# Patient Record
Sex: MALE | Race: WHITE | NOT HISPANIC OR LATINO | Employment: FULL TIME | URBAN - METROPOLITAN AREA
[De-identification: names, ages, dates, MRNs, and addresses within clinical notes are randomized per-mention and may not be internally consistent; named-entity substitution may affect disease eponyms.]

---

## 2020-02-10 ENCOUNTER — OFFICE VISIT (OUTPATIENT)
Dept: GASTROENTEROLOGY | Facility: CLINIC | Age: 55
End: 2020-02-10
Payer: COMMERCIAL

## 2020-02-10 VITALS
HEART RATE: 95 BPM | BODY MASS INDEX: 24.01 KG/M2 | DIASTOLIC BLOOD PRESSURE: 94 MMHG | HEIGHT: 67 IN | WEIGHT: 153 LBS | SYSTOLIC BLOOD PRESSURE: 140 MMHG

## 2020-02-10 DIAGNOSIS — Z12.11 COLON CANCER SCREENING: ICD-10-CM

## 2020-02-10 DIAGNOSIS — K64.8 OTHER HEMORRHOIDS: ICD-10-CM

## 2020-02-10 DIAGNOSIS — K62.5 RECTAL BLEEDING: Primary | ICD-10-CM

## 2020-02-10 PROCEDURE — 99213 OFFICE O/P EST LOW 20 MIN: CPT | Performed by: NURSE PRACTITIONER

## 2020-02-10 RX ORDER — TIMOLOL MALEATE 5 MG/ML
SOLUTION/ DROPS OPHTHALMIC
COMMUNITY
Start: 2020-02-03

## 2020-02-10 RX ORDER — BIMATOPROST 0.01 %
DROPS OPHTHALMIC (EYE)
COMMUNITY
Start: 2020-01-06

## 2020-02-10 NOTE — PATIENT INSTRUCTIONS
Continue Metamucil daily   Increase water intake   Use Preparation H suppositories daily at night for 14 days   Sit in warm water baths 15-20 minutes at least twice daily     Follow up in 3-4 weeks

## 2020-02-10 NOTE — PROGRESS NOTES
[id number] - Outpatient Follow-up Note  Hanna Huber 48 y o  male MRN: [id number]  Encounter: [id number]    ASSESSMENT AND PLAN:      1  Rectal bleeding  2  Other hemorrhoids   Episode of blood in the toilet and with wiping on the toilet paper last week  Has not had any further episodes of bleeding  Hemoglobin on 02/05 was 14 2  Patient did have grade 2 internal hemorrhoids on last colonoscopy in 2018  Most likely hemorrhoidal bleeding      -recommend Preparation-H suppositories nightly for 14 days  -continue Metamucil fiber supplementation daily  -Sitz baths  -increase fluid intake    If patient has any further episodes of bleeding, pain, or symptoms do not improve, will proceed with repeat colonoscopy  This can be discussed at next follow-up appointment  2  Colon cancer screening  Average risk  Last colonoscopy was on 03/05/2018  This showed normal mucosa throughout the whole colon and grade 2 internal hemorrhoids  A 10 year recall was advised, March 2028  Followup Appointment: 3-4 weeks   ______________________________________________________________________    Chief Complaint   Patient presents with    Rectal Bleeding     referred by employee wellness center     HPI: Hanna Huber is a 48y o  year old male who presents to the office today for rectal bleeding  Patient reports that last Wednesday, he noticed a large amount of blood in the toilet and on the toilet paper with wiping  He denies any melena  He has not had any further episodes since last Wednesday  He saw his avoxside at work, and had a rectal exam which was positive for occult blood in stool  He had a CBC checked on 02/05 which was normal   He denies any fever, chills, abdominal pain, change in bowel habits, melena, weight loss, rectal pain, itching  He typically has 1 formed bowel movement daily  His appetite is stable            Historical Information   Past Medical History: Diagnosis Date    Kidney stone      Past Surgical History:   Procedure Laterality Date    COLONOSCOPY  03/05/2018    normal mucosa, grade 2 internal hemorrhoids, 10 year recall 2028     LITHOTRIPSY       Social History     Substance and Sexual Activity   Alcohol Use Not Currently     Social History     Substance and Sexual Activity   Drug Use Not on file     Social History     Tobacco Use   Smoking Status Never Smoker   Smokeless Tobacco Never Used     Family History   Problem Relation Age of Onset    Diabetes Father     Colon cancer Neg Hx     Colon polyps Neg Hx          Current Outpatient Medications:     LUMIGAN 0 01 % ophthalmic drops    timolol (TIMOPTIC) 0 5 % ophthalmic solution  No Known Allergies  Reviewed medications and allergies and updated as indicated    PHYSICAL EXAM:    Blood pressure 140/94, pulse 95, height 5' 7" (1 702 m), weight 69 4 kg (153 lb)  Body mass index is 23 96 kg/m²  General Appearance: NAD, cooperative, alert  Eyes: Anicteric, PERRLA, EOMI  ENT:  Normocephalic, atraumatic, normal mucosa  Neck:  Supple, symmetrical, trachea midline  Resp:  Clear to auscultation bilaterally; no rales, rhonchi or wheezing; respirations unlabored   CV:  S1 S2, Regular rate and rhythm; no murmur, rub, or gallop  GI:  Soft, non-tender, non-distended; normal bowel sounds; no masses, no organomegaly   Rectal: no external hemorrhoids identified   Musculoskeletal: No cyanosis, clubbing or edema  Normal ROM  Skin:  No jaundice, rashes, or lesions   Heme/Lymph: No palpable cervical lymphadenopathy  Psych: Normal affect, good eye contact  Neuro: No gross deficits, AAOx3    Lab Results:   No results found for: WBC, HGB, HCT, MCV, PLT  No results found for: NA, K, CL, CO2, ANIONGAP, BUN, CREATININE, GLUCOSE, GLUF, CALCIUM, CORRECTEDCA, AST, ALT, ALKPHOS, PROT, BILITOT, EGFR  No results found for: IRON, TIBC, FERRITIN  No results found for: LIPASE    Radiology Results:   No results found

## 2020-03-16 ENCOUNTER — OFFICE VISIT (OUTPATIENT)
Dept: GASTROENTEROLOGY | Facility: CLINIC | Age: 55
End: 2020-03-16
Payer: COMMERCIAL

## 2020-03-16 VITALS
BODY MASS INDEX: 24.17 KG/M2 | HEART RATE: 88 BPM | SYSTOLIC BLOOD PRESSURE: 140 MMHG | DIASTOLIC BLOOD PRESSURE: 90 MMHG | WEIGHT: 154 LBS | HEIGHT: 67 IN

## 2020-03-16 DIAGNOSIS — Z12.11 COLON CANCER SCREENING: ICD-10-CM

## 2020-03-16 DIAGNOSIS — K64.8 OTHER HEMORRHOIDS: ICD-10-CM

## 2020-03-16 DIAGNOSIS — K62.5 RECTAL BLEEDING: Primary | ICD-10-CM

## 2020-03-16 PROCEDURE — 99213 OFFICE O/P EST LOW 20 MIN: CPT | Performed by: NURSE PRACTITIONER

## 2020-03-16 NOTE — PATIENT INSTRUCTIONS
Continue Metamucil daily in the morning   Increase fluid intake   May use Preparation H ointment as needed     Monitor symptoms and call office if symptoms return or worsen     Follow up as needed       Hemorrhoids   WHAT YOU NEED TO KNOW:   Hemorrhoids are swollen blood vessels inside your rectum (internal hemorrhoids) or on your anus (external hemorrhoids)  Sometimes a hemorrhoid may prolapse  This means it extends out of your anus  DISCHARGE INSTRUCTIONS:   Return to the emergency department if:   · You have severe pain in your rectum or around your anus  · You have severe pain in your abdomen and you are vomiting  · You have bleeding from your anus that soaks through your underwear  Contact your healthcare provider if:   · You have frequent and painful bowel movements  · Your hemorrhoid looks or feels more swollen than usual      · You do not have a bowel movement for 2 days or more  · You see or feel tissue coming through your anus  · You have questions or concerns about your condition or care  Medicines: You may  need any of the following:  · A pad, cream, or ointment  can help decrease pain, swelling, and itching  · Stool softeners  help treat or prevent constipation  · NSAIDs , such as ibuprofen, help decrease swelling, pain, and fever  NSAIDs can cause stomach bleeding or kidney problems in certain people  If you take blood thinner medicine, always ask your healthcare provider if NSAIDs are safe for you  Always read the medicine label and follow directions  · Take your medicine as directed  Contact your healthcare provider if you think your medicine is not helping or if you have side effects  Tell him or her if you are allergic to any medicine  Keep a list of the medicines, vitamins, and herbs you take  Include the amounts, and when and why you take them  Bring the list or the pill bottles to follow-up visits   Carry your medicine list with you in case of an emergency  Manage your symptoms:   · Apply ice on your anus for 15 to 20 minutes every hour or as directed  Use an ice pack, or put crushed ice in a plastic bag  Cover it with a towel before you apply it to your anus  Ice helps prevent tissue damage and decreases swelling and pain  · Take a sitz bath  Fill a bathtub with 4 to 6 inches of warm water  You may also use a sitz bath pan that fits inside a toilet bowl  Sit in the sitz bath for 15 minutes  Do this 3 times a day, and after each bowel movement  The warm water can help decrease pain and swelling  · Keep your anal area clean  Gently wash the area with warm water daily  Soap may irritate the area  After a bowel movement, wipe with moist towelettes or wet toilet paper  Dry toilet paper can irritate the area  Prevent hemorrhoids:   · Do not strain to have a bowel movement  Do not sit on the toilet too long  These actions can increase pressure on the tissues in your rectum and anus  · Drink plenty of liquids  Liquids can help prevent constipation  Ask how much liquid to drink each day and which liquids are best for you  · Eat a variety of high-fiber foods  Examples include fruits, vegetables, and whole grains  Ask your healthcare provider how much fiber you need each day  You may need to take a fiber supplement  · Exercise as directed  Exercise, such as walking, may make it easier to have a bowel movement  Ask your healthcare provider to help you create an exercise plan  · Do not have anal sex  Anal sex can weaken the skin around your rectum and anus  · Avoid heavy lifting  This can cause straining and increase your risk for another hemorrhoid  Follow up with your healthcare provider as directed:  Write down your questions so you remember to ask them during your visits     © 2017 [employer] Information is for End User's use only and may not be sold, redistributed or otherwise used for commercial purposes  All illustrations and images included in CareNotes® are the copyrighted property of A [employer] M [employer]  or Cristian Vega  The above information is an  only  It is not intended as medical advice for individual conditions or treatments  Talk to your doctor, nurse or pharmacist before following any medical regimen to see if it is safe and effective for you

## 2020-03-16 NOTE — PROGRESS NOTES
[facility] - Outpatient Follow-up Note  Tanisha Dickinson 48 y o  male MRN: [id number]  Encounter: [id number]    ASSESSMENT AND PLAN:      1  Rectal bleeding  2  Other hemorrhoids  Symptoms have improved since last office visit  Patient denies any further episodes of rectal bleeding  Most likely symptoms were attributed to hemorrhoidal bleeding      -continue daily fiber supplementation (Metamucil 1 packet in AM)   -continue to increase fluid intake  -can use preparation H ointment p r n   -avoid prolonged sitting or straining  -if rectal bleeding returns or symptoms worsen, will repeat colonoscopy at that time  3  Colon cancer screening  Average risk  Last colonoscopy was on 3/5/18  This showed normal mucosa throughout the whole colon and grade 2 internal hemorrhoids  A 10 year recall was recommended, March 2028  Followup Appointment: As needed or if symptoms worsen   ______________________________________________________________________    Chief Complaint   Patient presents with    Follow-up     HPI: Tanisha Dickinsno is a 48y o  year old male returns to the office today for follow-up of hemorrhoids and rectal bleeding  Patient was seen in the office on 02/10 and had an episode of blood in the toilet and with wiping on the toilet paper  Hemoglobin was stable at that time  He denies any further episodes of rectal bleeding  He states that he is moving his bowels regularly  He denies any fever/chills, nausea/vomiting, abdominal pain, diarrhea, rectal bleeding, melena            Historical Information   Past Medical History:   Diagnosis Date    Kidney stone     Ruptured spleen     did not require splenectomy     Past Surgical History:   Procedure Laterality Date    COLONOSCOPY  03/05/2018    normal mucosa, grade 2 internal hemorrhoids, 10 year recall 2028     LITHOTRIPSY       Social History     Substance and Sexual Activity   Alcohol Use Not Currently     Social History Substance and Sexual Activity   Drug Use Not on file     Social History     Tobacco Use   Smoking Status Never Smoker   Smokeless Tobacco Never Used     Family History   Problem Relation Age of Onset    No Known Problems Mother     Diabetes Father     No Known Problems Sister     No Known Problems Sister     Colon cancer Neg Hx     Colon polyps Neg Hx          Current Outpatient Medications:     LUMIGAN 0 01 % ophthalmic drops    timolol (TIMOPTIC) 0 5 % ophthalmic solution  No Known Allergies  Reviewed medications and allergies and updated as indicated    PHYSICAL EXAM:    Blood pressure 140/90, pulse 88, height 5' 7" (1 702 m), weight 69 9 kg (154 lb)  Body mass index is 24 12 kg/m²  General Appearance: NAD, cooperative, alert  Eyes: Anicteric, PERRLA, EOMI  ENT:  Normocephalic, atraumatic, normal mucosa  Neck:  Supple, symmetrical, trachea midline  Resp:  Clear to auscultation bilaterally; no rales, rhonchi or wheezing; respirations unlabored   CV:  S1 S2, Regular rate and rhythm; no murmur, rub, or gallop  GI:  Soft, non-tender, non-distended; normal bowel sounds; no masses, no organomegaly   Rectal: Deferred  Musculoskeletal: No cyanosis, clubbing or edema  Normal ROM  Skin:  No jaundice, rashes, or lesions   Heme/Lymph: No palpable cervical lymphadenopathy  Psych: Normal affect, good eye contact  Neuro: No gross deficits, AAOx3    Lab Results:   No results found for: WBC, HGB, HCT, MCV, PLT  No results found for: NA, K, CL, CO2, ANIONGAP, BUN, CREATININE, GLUCOSE, GLUF, CALCIUM, CORRECTEDCA, AST, ALT, ALKPHOS, PROT, BILITOT, EGFR  No results found for: IRON, TIBC, FERRITIN  No results found for: LIPASE    Radiology Results:   No results found

## 2020-08-11 ENCOUNTER — TELEPHONE (OUTPATIENT)
Dept: GASTROENTEROLOGY | Facility: CLINIC | Age: 55
End: 2020-08-11

## 2020-08-11 ENCOUNTER — OFFICE VISIT (OUTPATIENT)
Dept: GASTROENTEROLOGY | Facility: CLINIC | Age: 55
End: 2020-08-11
Payer: COMMERCIAL

## 2020-08-11 VITALS
SYSTOLIC BLOOD PRESSURE: 160 MMHG | WEIGHT: 148 LBS | BODY MASS INDEX: 23.23 KG/M2 | DIASTOLIC BLOOD PRESSURE: 92 MMHG | TEMPERATURE: 97.3 F | HEIGHT: 67 IN

## 2020-08-11 DIAGNOSIS — K62.89 RECTAL PAIN: Primary | ICD-10-CM

## 2020-08-11 DIAGNOSIS — K64.5 EXTERNAL THROMBOSED HEMORRHOIDS: ICD-10-CM

## 2020-08-11 DIAGNOSIS — Z12.11 SCREENING FOR COLON CANCER: ICD-10-CM

## 2020-08-11 PROCEDURE — 99214 OFFICE O/P EST MOD 30 MIN: CPT | Performed by: NURSE PRACTITIONER

## 2020-08-11 NOTE — PROGRESS NOTES
[facility] - Outpatient Follow-up Note  Carlotta Reyna 48 y o  male MRN: [id number]  Encounter: [id number]    ASSESSMENT AND PLAN:      1  Rectal pain  2  External thrombosed hemorrhoids   Two large external thrombosed hemorrhoids noted on today's rectal examination  Extremely painful to palpate  Referral to colorectal surgeon - the patient has an appointment with Dr Choco Mckinley, colorectal surgeon, today at 12:30 p m  Sit in a warm bath tub for 15 minutes daily  Increase fluid and fiber  Daily fiber supplementation  Preparation-H hemorrhoidal cream free to 4 times a day  Referral to colorectal surgeon    3  Screening for colon cancer  Average risk for colorectal neoplasm    Last colonoscopy was on 3/5/18  This showed normal mucosa throughout the whole colon and grade 2 internal hemorrhoids  A 10 year recall advised  Next screening colonoscopy due in March of 2028  Followup Appointment:  4-6 weeks  ______________________________________________________________________    Chief Complaint   Patient presents with    Hemorrhoids     HPI:     Longstanding issues rectal bleeding secondary to hemorrhoids  Scant amount of bleeding noted on toilet tissue, episodically for the past  couple of years  Inrequent over the several weeks  However, severe rectal pain yesterday that became progressively worse  Pain so severe that he needed to leave work  Takes daily fiber supplement daily and has been trying to increase water intake  Denies straining to have bowel movement  Denies change in bowel habits, constipation or diarrhea  Denies fevers or chills  Denies any rectal purulent drainage  Appetite and weight stable  Denies any abdominal pain, nausea, vomiting or melena      Historical Information   Past Medical History:   Diagnosis Date    Kidney stone     Ruptured spleen     did not require splenectomy     Past Surgical History:   Procedure Laterality Date    COLONOSCOPY  03/05/2018    normal mucosa, grade 2 internal hemorrhoids, 10 year recall 2028     LITHOTRIPSY       Social History     Substance and Sexual Activity   Alcohol Use Not Currently     Social History     Substance and Sexual Activity   Drug Use Not on file     Social History     Tobacco Use   Smoking Status Never Smoker   Smokeless Tobacco Never Used     Family History   Problem Relation Age of Onset    No Known Problems Mother     Diabetes Father     No Known Problems Sister     No Known Problems Sister     Colon cancer Neg Hx     Colon polyps Neg Hx          Current Outpatient Medications:     LUMIGAN 0 01 % ophthalmic drops    timolol (TIMOPTIC) 0 5 % ophthalmic solution  No Known Allergies  Reviewed medications and allergies and updated as indicated    PHYSICAL EXAM:    Blood pressure 160/92, temperature (!) 97 3 °F (36 3 °C), height 5' 7" (1 702 m), weight 67 1 kg (148 lb)  Body mass index is 23 18 kg/m²  General Appearance: NAD, cooperative, alert  Eyes: Anicteric  ENT:  Normocephali  Neck:  Appears normal  Resp:  Clear to auscultation bilaterally; no rales, rhonchi or wheezing; respirations unlabored   CV:  S1 S2, Regular rate and rhythm; no murmur, rub, or gallop  GI:  Soft, non-tender, non-distended; normal bowel sounds; no masses, no organomegaly   Rectal:  Two large thrombosed hemorrhoids, purple in color, extremely painful to touch  Musculoskeletal: No cyanosis, clubbing or edema  Normal ROM  Skin:  No jaundice, rashes, or lesions   Psych: Normal affect, good eye contact  Neuro: No gross deficits, AAOx3    Lab Results:   No results found for: WBC, HGB, HCT, MCV, PLT  No results found for: NA, K, CL, CO2, ANIONGAP, BUN, CREATININE, GLUCOSE, GLUF, CALCIUM, CORRECTEDCA, AST, ALT, ALKPHOS, PROT, BILITOT, EGFR  No results found for: IRON, TIBC, FERRITIN  No results found for: LIPASE    Radiology Results:   No results found

## 2020-08-11 NOTE — PATIENT INSTRUCTIONS
Sit in a warm bath tub for 15 minutes daily  Increase fluid and fiber  Daily fiber supplementation  Referral to colorectal surgeon

## 2020-08-11 NOTE — TELEPHONE ENCOUNTER
Rec advised was transferring Pt who has painful hemorrhoid/needs to know if he should go to the ER  Appt sched'd tomorrow  Pt was not on the line

## 2020-08-11 NOTE — TELEPHONE ENCOUNTER
Pt called back; questions what to do for pain? - rates 8-9; has 2 painful hemorrhoids  Advised ER or calling surgeon  Pt considered ER; was going to keep appt tomorrow  While talking w/ Pt nurse noted avail appt today w/ Toshia Sanders  Offered/Pt accepted

## 2020-09-14 ENCOUNTER — OFFICE VISIT (OUTPATIENT)
Dept: GASTROENTEROLOGY | Facility: CLINIC | Age: 55
End: 2020-09-14
Payer: COMMERCIAL

## 2020-09-14 VITALS
DIASTOLIC BLOOD PRESSURE: 88 MMHG | HEART RATE: 107 BPM | TEMPERATURE: 97.7 F | WEIGHT: 148 LBS | BODY MASS INDEX: 23.23 KG/M2 | HEIGHT: 67 IN | SYSTOLIC BLOOD PRESSURE: 128 MMHG

## 2020-09-14 DIAGNOSIS — Z12.11 SCREENING FOR COLON CANCER: ICD-10-CM

## 2020-09-14 DIAGNOSIS — K64.5 EXTERNAL THROMBOSED HEMORRHOIDS: Primary | ICD-10-CM

## 2020-09-14 PROCEDURE — 99214 OFFICE O/P EST MOD 30 MIN: CPT | Performed by: NURSE PRACTITIONER

## 2020-09-14 NOTE — PROGRESS NOTES
[facility] - Outpatient Follow-up Note  Andrea Navas 48 y o  male MRN: [id number]  Encounter: [id number]    ASSESSMENT AND PLAN:      1  External thrombosed hemorrhoids  Underwent an emergent hemorrhoidectomy on 08/12 by Dr Patsy Washington for 2 large thrombosed hemorrhoids noted on last office visit  Resolved  - continue to increase fluid and fiber  - continue daily fiber supplementation    2  Screening for colon cancer   Average risk  Last colonoscopy in March 2018 showed the absence adenomatous colon polyps  A 10 year recall advised  Followup Appointment:  As needed  ______________________________________________________________________    Chief Complaint   Patient presents with    Follow-up    Hemorrhoids     HPI:    Follow-up office visit to two large thrombosed hemorrhoids that were identified at last office visit on 08/11  He was subsequently seen by a colorectal surgeon, Dr Patsy Washington, the following day and underwent an emergency hemorrhoidectomy that was performed in the hospital setting due to the large size of the hemorrhoids  Did experience some rectal pain approximately a week after surgery, but denies any further rectal pain  Denies any rectal bleeding, constipation, diarrhea, melena, nausea, vomiting, fevers or chills  Appetite good and weight stable      Historical Information   Past Medical History:   Diagnosis Date    Kidney stone     Ruptured spleen     did not require splenectomy     Past Surgical History:   Procedure Laterality Date    COLONOSCOPY  03/05/2018    normal mucosa, grade 2 internal hemorrhoids, 10 year recall 2028     LITHOTRIPSY       Social History     Substance and Sexual Activity   Alcohol Use Not Currently     Social History     Substance and Sexual Activity   Drug Use Not on file     Social History     Tobacco Use   Smoking Status Never Smoker   Smokeless Tobacco Never Used     Family History   Problem Relation Age of Onset    No Known Problems Mother     Diabetes Father     No Known Problems Sister     No Known Problems Sister     Colon cancer Neg Hx     Colon polyps Neg Hx          Current Outpatient Medications:     LUMIGAN 0 01 % ophthalmic drops    timolol (TIMOPTIC) 0 5 % ophthalmic solution  No Known Allergies  Reviewed medications and allergies and updated as indicated    PHYSICAL EXAM:    Blood pressure 128/88, pulse (!) 107, temperature 97 7 °F (36 5 °C), height 5' 7" (1 702 m), weight 67 1 kg (148 lb)  Body mass index is 23 18 kg/m²  General Appearance: NAD, cooperative, alert  Eyes: Anicteric  ENT:  Normocephalic    Neck: Appears normal  Resp:  Clear to auscultation bilaterally; no rales, rhonchi or wheezing; respirations unlabored   CV:  S1 S2, Regular rate and rhythm; no murmur, rub, or gallop  GI:  Soft, non-tender, non-distended; normal bowel sounds; no masses, no organomegaly   Rectal:  No external hemorrhoids noted  Musculoskeletal: No cyanosis, clubbing or edema  Normal ROM  Skin:  No jaundice, rashes, or lesions   Psych: Normal affect, good eye contact  Neuro: No gross deficits, AAOx3    Lab Results:   No results found for: WBC, HGB, HCT, MCV, PLT  No results found for: NA, K, CL, CO2, ANIONGAP, BUN, CREATININE, GLUCOSE, GLUF, CALCIUM, CORRECTEDCA, AST, ALT, ALKPHOS, PROT, BILITOT, EGFR  No results found for: IRON, TIBC, FERRITIN  No results found for: LIPASE    Radiology Results:   No results found

## 2020-10-19 ENCOUNTER — OFFICE VISIT (OUTPATIENT)
Dept: FAMILY MEDICINE CLINIC | Facility: HOSPITAL | Age: 55
End: 2020-10-19
Payer: COMMERCIAL

## 2020-10-19 VITALS
WEIGHT: 146.8 LBS | BODY MASS INDEX: 23.04 KG/M2 | OXYGEN SATURATION: 98 % | HEIGHT: 67 IN | TEMPERATURE: 98.4 F | SYSTOLIC BLOOD PRESSURE: 130 MMHG | HEART RATE: 97 BPM | DIASTOLIC BLOOD PRESSURE: 80 MMHG

## 2020-10-19 DIAGNOSIS — Z13.0 SCREENING FOR ENDOCRINE, METABOLIC AND IMMUNITY DISORDER: ICD-10-CM

## 2020-10-19 DIAGNOSIS — Z13.228 SCREENING FOR ENDOCRINE, METABOLIC AND IMMUNITY DISORDER: ICD-10-CM

## 2020-10-19 DIAGNOSIS — N52.8 OTHER MALE ERECTILE DYSFUNCTION: ICD-10-CM

## 2020-10-19 DIAGNOSIS — R73.03 PREDIABETES: ICD-10-CM

## 2020-10-19 DIAGNOSIS — Z13.220 SCREENING CHOLESTEROL LEVEL: Primary | ICD-10-CM

## 2020-10-19 DIAGNOSIS — Z13.29 SCREENING FOR ENDOCRINE, METABOLIC AND IMMUNITY DISORDER: ICD-10-CM

## 2020-10-19 DIAGNOSIS — Z12.5 SCREENING FOR PROSTATE CANCER: ICD-10-CM

## 2020-10-19 PROCEDURE — 3725F SCREEN DEPRESSION PERFORMED: CPT | Performed by: NURSE PRACTITIONER

## 2020-10-19 PROCEDURE — 1036F TOBACCO NON-USER: CPT | Performed by: NURSE PRACTITIONER

## 2020-10-19 PROCEDURE — 99203 OFFICE O/P NEW LOW 30 MIN: CPT | Performed by: NURSE PRACTITIONER

## 2020-10-26 LAB
ALBUMIN SERPL-MCNC: 4.4 G/DL (ref 3.8–4.9)
ALBUMIN/GLOB SERPL: 2 {RATIO} (ref 1.2–2.2)
ALP SERPL-CCNC: 55 IU/L (ref 39–117)
ALT SERPL-CCNC: 34 IU/L (ref 0–44)
AST SERPL-CCNC: 25 IU/L (ref 0–40)
BASOPHILS # BLD AUTO: 0 X10E3/UL (ref 0–0.2)
BASOPHILS NFR BLD AUTO: 0 %
BILIRUB SERPL-MCNC: 0.5 MG/DL (ref 0–1.2)
BUN SERPL-MCNC: 17 MG/DL (ref 6–24)
BUN/CREAT SERPL: 22 (ref 9–20)
CALCIUM SERPL-MCNC: 9.2 MG/DL (ref 8.7–10.2)
CHLORIDE SERPL-SCNC: 103 MMOL/L (ref 96–106)
CHOLEST SERPL-MCNC: 187 MG/DL (ref 100–199)
CHOLEST/HDLC SERPL: 3.9 RATIO (ref 0–5)
CO2 SERPL-SCNC: 26 MMOL/L (ref 20–29)
CREAT SERPL-MCNC: 0.79 MG/DL (ref 0.76–1.27)
EOSINOPHIL # BLD AUTO: 0.1 X10E3/UL (ref 0–0.4)
EOSINOPHIL NFR BLD AUTO: 1 %
ERYTHROCYTE [DISTWIDTH] IN BLOOD BY AUTOMATED COUNT: 13.8 % (ref 11.6–15.4)
EST. AVERAGE GLUCOSE BLD GHB EST-MCNC: 117 MG/DL
GLOBULIN SER-MCNC: 2.2 G/DL (ref 1.5–4.5)
GLUCOSE SERPL-MCNC: 114 MG/DL (ref 65–99)
HBA1C MFR BLD: 5.7 % (ref 4.8–5.6)
HCT VFR BLD AUTO: 45.6 % (ref 37.5–51)
HDLC SERPL-MCNC: 48 MG/DL
HGB BLD-MCNC: 14.8 G/DL (ref 13–17.7)
IMM GRANULOCYTES # BLD: 0 X10E3/UL (ref 0–0.1)
IMM GRANULOCYTES NFR BLD: 0 %
LDLC SERPL CALC-MCNC: 123 MG/DL (ref 0–99)
LYMPHOCYTES # BLD AUTO: 1.4 X10E3/UL (ref 0.7–3.1)
LYMPHOCYTES NFR BLD AUTO: 15 %
MCH RBC QN AUTO: 30 PG (ref 26.6–33)
MCHC RBC AUTO-ENTMCNC: 32.5 G/DL (ref 31.5–35.7)
MCV RBC AUTO: 93 FL (ref 79–97)
MONOCYTES # BLD AUTO: 0.5 X10E3/UL (ref 0.1–0.9)
MONOCYTES NFR BLD AUTO: 5 %
NEUTROPHILS # BLD AUTO: 7.5 X10E3/UL (ref 1.4–7)
NEUTROPHILS NFR BLD AUTO: 79 %
PLATELET # BLD AUTO: 308 X10E3/UL (ref 150–450)
POTASSIUM SERPL-SCNC: 4.5 MMOL/L (ref 3.5–5.2)
PROT SERPL-MCNC: 6.6 G/DL (ref 6–8.5)
PSA SERPL-MCNC: 0.6 NG/ML (ref 0–4)
RBC # BLD AUTO: 4.93 X10E6/UL (ref 4.14–5.8)
SL AMB EGFR AFRICAN AMERICAN: 119 ML/MIN/1.73
SL AMB EGFR NON AFRICAN AMERICAN: 103 ML/MIN/1.73
SL AMB REFLEX CRITERIA: NORMAL
SL AMB VLDL CHOLESTEROL CALC: 16 MG/DL (ref 5–40)
SODIUM SERPL-SCNC: 142 MMOL/L (ref 134–144)
TESTOST FREE SERPL-MCNC: 15.4 PG/ML (ref 7.2–24)
TESTOST SERPL-MCNC: 388 NG/DL (ref 264–916)
TRIGL SERPL-MCNC: 89 MG/DL (ref 0–149)
TSH SERPL DL<=0.005 MIU/L-ACNC: 1.05 UIU/ML (ref 0.45–4.5)
WBC # BLD AUTO: 9.6 X10E3/UL (ref 3.4–10.8)

## 2020-11-10 ENCOUNTER — TELEPHONE (OUTPATIENT)
Dept: FAMILY MEDICINE CLINIC | Facility: HOSPITAL | Age: 55
End: 2020-11-10

## 2022-08-08 ENCOUNTER — TELEPHONE (OUTPATIENT)
Dept: FAMILY MEDICINE CLINIC | Facility: HOSPITAL | Age: 57
End: 2022-08-08